# Patient Record
Sex: FEMALE | Race: WHITE | NOT HISPANIC OR LATINO | Employment: OTHER | URBAN - METROPOLITAN AREA
[De-identification: names, ages, dates, MRNs, and addresses within clinical notes are randomized per-mention and may not be internally consistent; named-entity substitution may affect disease eponyms.]

---

## 2017-11-02 PROBLEM — 197480006 ANXIETY DISORDER: Status: ACTIVE | Noted: 2017-11-02

## 2017-11-02 PROBLEM — 38341003 HYPERTENSION: Status: ACTIVE | Noted: 2017-11-02

## 2017-11-02 PROBLEM — 22298006 MYOCARDIAL INFARCTION: Status: ACTIVE | Noted: 2017-11-02

## 2017-11-02 PROBLEM — 13644009 HYPERCHOLESTEROLEMIA: Status: ACTIVE | Noted: 2017-11-02

## 2018-12-16 ENCOUNTER — HOSPITAL ENCOUNTER (EMERGENCY)
Facility: HOSPITAL | Age: 73
Discharge: HOME | End: 2018-12-16
Attending: INTERNAL MEDICINE
Payer: MEDICARE

## 2018-12-16 VITALS
HEART RATE: 54 BPM | SYSTOLIC BLOOD PRESSURE: 149 MMHG | BODY MASS INDEX: 21.33 KG/M2 | TEMPERATURE: 98 F | OXYGEN SATURATION: 97 % | HEIGHT: 65 IN | RESPIRATION RATE: 16 BRPM | WEIGHT: 128 LBS | DIASTOLIC BLOOD PRESSURE: 67 MMHG

## 2018-12-16 DIAGNOSIS — S09.90XA TRAUMATIC INJURY OF HEAD, INITIAL ENCOUNTER: Primary | ICD-10-CM

## 2018-12-16 DIAGNOSIS — T14.8XXA MULTIPLE SKIN TEARS: ICD-10-CM

## 2018-12-16 DIAGNOSIS — T14.90XA TRAUMA: ICD-10-CM

## 2018-12-16 PROCEDURE — 72125 CT NECK SPINE W/O DYE: CPT | Mod: 26,,, | Performed by: RADIOLOGY

## 2018-12-16 PROCEDURE — 73060 X-RAY EXAM OF HUMERUS: CPT | Mod: 26,RT,, | Performed by: RADIOLOGY

## 2018-12-16 PROCEDURE — 70450 CT HEAD/BRAIN W/O DYE: CPT | Mod: 26,,, | Performed by: RADIOLOGY

## 2018-12-16 PROCEDURE — 70486 CT MAXILLOFACIAL W/O DYE: CPT | Mod: 26,,, | Performed by: RADIOLOGY

## 2018-12-16 PROCEDURE — 73060 X-RAY EXAM OF HUMERUS: CPT | Mod: TC,FY,RT

## 2018-12-16 PROCEDURE — 73110 X-RAY EXAM OF WRIST: CPT | Mod: 26,RT,, | Performed by: RADIOLOGY

## 2018-12-16 PROCEDURE — 70486 CT MAXILLOFACIAL W/O DYE: CPT | Mod: TC

## 2018-12-16 PROCEDURE — 72125 CT NECK SPINE W/O DYE: CPT | Mod: TC

## 2018-12-16 PROCEDURE — 70450 CT HEAD/BRAIN W/O DYE: CPT | Mod: TC

## 2018-12-16 PROCEDURE — 99284 EMERGENCY DEPT VISIT MOD MDM: CPT | Mod: 25

## 2018-12-16 PROCEDURE — 73110 X-RAY EXAM OF WRIST: CPT | Mod: TC,FY,RT

## 2018-12-16 RX ORDER — AMLODIPINE BESYLATE 5 MG/1
5 TABLET ORAL DAILY
COMMUNITY

## 2018-12-16 RX ORDER — ASPIRIN 81 MG/1
81 TABLET ORAL DAILY
COMMUNITY

## 2018-12-16 RX ORDER — CITALOPRAM 20 MG/1
20 TABLET, FILM COATED ORAL DAILY
COMMUNITY

## 2018-12-16 RX ORDER — CLOPIDOGREL BISULFATE 75 MG/1
75 TABLET ORAL DAILY
COMMUNITY

## 2018-12-16 RX ORDER — ATORVASTATIN CALCIUM 40 MG/1
40 TABLET, FILM COATED ORAL DAILY
COMMUNITY

## 2018-12-16 RX ORDER — PANTOPRAZOLE SODIUM 40 MG/1
40 TABLET, DELAYED RELEASE ORAL 2 TIMES DAILY
COMMUNITY

## 2018-12-16 RX ORDER — METOPROLOL TARTRATE 50 MG/1
50 TABLET ORAL 2 TIMES DAILY
COMMUNITY

## 2018-12-16 NOTE — ED NOTES
Pt lying in ER bed. Plan of care discussed with pt. Side rails up for safety. Call bell within reach. No needs voiced. Will continue to monitor.

## 2018-12-16 NOTE — ED PROVIDER NOTES
Encounter Date: 12/16/2018       History     Chief Complaint   Patient presents with    Fall    Loss of Consciousness    Neck Pain    Wrist Pain     Patient was visiting her grandchildren, picked up a grandchild lost balance and fell forward on her face.  She struck her right zygoma her dorsal nasal bridge and complained of mild neck pain. She also complains of pain on the right wrist with a hematoma and pain in the right humerus.  She has a mild headache.          Review of patient's allergies indicates:   Allergen Reactions    Codeine      Past Medical History:   Diagnosis Date    Hyperlipemia     Hypertension     Myocardial infarct      Past Surgical History:   Procedure Laterality Date    CORONARY ANGIOPLASTY WITH STENT PLACEMENT       History reviewed. No pertinent family history.  Social History     Tobacco Use    Smoking status: Never Smoker    Smokeless tobacco: Never Used   Substance Use Topics    Alcohol use: Yes     Comment: Rarely    Drug use: No     Review of Systems   Constitutional: Negative for fever.   HENT: Negative for sore throat.    Respiratory: Negative for shortness of breath.    Cardiovascular: Negative for chest pain.   Gastrointestinal: Negative for nausea.   Genitourinary: Negative for dysuria.   Musculoskeletal: Negative for back pain.   Skin: Negative for rash.   Neurological: Positive for headaches. Negative for weakness.   Hematological: Does not bruise/bleed easily.   All other systems reviewed and are negative.      Physical Exam     Initial Vitals [12/16/18 1707]   BP Pulse Resp Temp SpO2   (!) 171/81 (!) 59 16 97.7 °F (36.5 °C) 98 %      MAP       --         Physical Exam    Nursing note and vitals reviewed.  Constitutional: Vital signs are normal. She appears well-developed and well-nourished. She is active and cooperative.   HENT:   Head: Normocephalic and atraumatic.       Abrasion on the bridge of the nose and the right zygoma   Eyes: Conjunctivae, EOM and lids are  normal. Pupils are equal, round, and reactive to light. Lids are everted and swept, no foreign bodies found.   Neck: Trachea normal, normal range of motion and full passive range of motion without pain. Neck supple.   Cardiovascular: Normal rate, regular rhythm, S1 normal, S2 normal, normal heart sounds, intact distal pulses and normal pulses.  No extrasystoles are present.    Pulmonary/Chest: Breath sounds normal.   Abdominal: Soft. Normal appearance and bowel sounds are normal.   Musculoskeletal: Normal range of motion.        Arms:  Contusion of the dorsum of the right wrist with a small abrasion   Neurological: She is alert. She has normal reflexes. GCS eye subscore is 4. GCS verbal subscore is 5. GCS motor subscore is 6.   Skin: Skin is warm, dry and intact. Capillary refill takes less than 2 seconds.   Psychiatric: She has a normal mood and affect. Her speech is normal and behavior is normal. Cognition and memory are normal.         ED Course   Procedures  Labs Reviewed - No data to display       Imaging Results          CT Head Without Contrast (In process)                CT Maxillofacial Without Contrast (In process)                CT Cervical Spine Without Contrast (In process)                X-Ray Wrist Complete Right (In process)                X-Ray Humerus 2 View Right (In process)                  Medical Decision Making:   ED Management:  CT cervical spine: IMPRESSION:  1. No acute fracture.  2. 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      CT facial bones: IMPRESSION:  1. No acute fracture.  2. Mild paranasal sinus disease involving the ethmoid air cells.  3. Mild mucosal thickening of the left maxillary sinus.      CT head: IMPRESSION:  1. No acute intracranial hemorrhage.  2. No calvarial fracture.  3. Senescent changes as detailed above.    Xray right humerus: no fracture noted. Right wrist shows no fracture or dislocation.                       Clinical Impression:   The primary  encounter diagnosis was Traumatic injury of head, initial encounter. Diagnoses of Trauma and Multiple skin tears were also pertinent to this visit.                             Daxa Bright MD  12/16/18 1914

## 2018-12-16 NOTE — ED TRIAGE NOTES
Pt to ER via EMS. Pt had a syncopal episode while attempting to  her grandchild. Pt reports neck pain and R arm and wrist pain. Laceration noted to bridge of pt's nose and under R eye. Bruising to R wrist noted. Pt A&O on arrival, C collar in place.

## 2018-12-17 NOTE — DISCHARGE INSTRUCTIONS
Clean wounds twice daily with soap and water pat dry and apply very thin layer of Vaseline until healed.  Follow-up instructions on head injury sheet.  Return to the ER for any change or worsening of condition.  Follow up with her primary care provider in the next 2-3 days.  May use over-the-counter Tylenol for any pain or discomfort.

## 2018-12-17 NOTE — ED NOTES
Pt lying in ER bed, respirations equal and unlabored.  at the bedside. Plan of care discussed. No needs voiced. Will continue to monitor.

## 2021-07-01 ENCOUNTER — PATIENT MESSAGE (OUTPATIENT)
Dept: ADMINISTRATIVE | Facility: OTHER | Age: 76
End: 2021-07-01

## 2021-08-28 ENCOUNTER — TELEPHONE ENCOUNTER (OUTPATIENT)
Dept: URBAN - METROPOLITAN AREA CLINIC 13 | Facility: CLINIC | Age: 76
End: 2021-08-28

## 2021-08-29 ENCOUNTER — TELEPHONE ENCOUNTER (OUTPATIENT)
Dept: URBAN - METROPOLITAN AREA CLINIC 13 | Facility: CLINIC | Age: 76
End: 2021-08-29

## 2021-08-29 RX ORDER — ATORVASTATIN CALCIUM 40 MG/1
TABLET ORAL
Status: ACTIVE | COMMUNITY

## 2021-08-29 RX ORDER — CITALOPRAM 20 MG/1
TABLET ORAL
Status: ACTIVE | COMMUNITY

## 2021-08-29 RX ORDER — AMLODIPINE BESYLATE 5 MG/1
TABLET ORAL
Status: ACTIVE | COMMUNITY

## 2021-08-29 RX ORDER — CLOPIDOGREL 75 MG/1
TABLET ORAL
Status: ACTIVE | COMMUNITY

## 2021-08-29 RX ORDER — METOPROLOL TARTRATE 50 MG/1
TABLET, FILM COATED ORAL
Status: ACTIVE | COMMUNITY

## 2021-08-29 RX ORDER — ASPIRIN 81 MG/1
TABLET, COATED ORAL
Status: ACTIVE | COMMUNITY

## 2021-08-29 RX ORDER — PANTOPRAZOLE SODIUM 40 MG/1
TABLET, DELAYED RELEASE ORAL
Status: ACTIVE | COMMUNITY